# Patient Record
Sex: OTHER/UNKNOWN | URBAN - METROPOLITAN AREA
[De-identification: names, ages, dates, MRNs, and addresses within clinical notes are randomized per-mention and may not be internally consistent; named-entity substitution may affect disease eponyms.]

---

## 2020-11-17 ENCOUNTER — APPOINTMENT (OUTPATIENT)
Age: 25
Setting detail: DERMATOLOGY
End: 2020-11-27

## 2020-11-17 VITALS
DIASTOLIC BLOOD PRESSURE: 98 MMHG | HEIGHT: 66 IN | SYSTOLIC BLOOD PRESSURE: 175 MMHG | WEIGHT: 150 LBS | TEMPERATURE: 97.3 F | HEART RATE: 77 BPM

## 2020-11-17 DIAGNOSIS — L20.89 OTHER ATOPIC DERMATITIS: ICD-10-CM

## 2020-11-17 PROBLEM — L20.84 INTRINSIC (ALLERGIC) ECZEMA: Status: ACTIVE | Noted: 2020-11-17

## 2020-11-17 PROCEDURE — 99203 OFFICE O/P NEW LOW 30 MIN: CPT

## 2020-11-17 PROCEDURE — OTHER TREATMENT REGIMEN: OTHER

## 2020-11-17 PROCEDURE — OTHER PRESCRIPTION: OTHER

## 2020-11-17 PROCEDURE — OTHER COUNSELING: OTHER

## 2020-11-17 PROCEDURE — OTHER MIPS QUALITY: OTHER

## 2020-11-17 RX ORDER — TRIAMCINOLONE ACETONIDE 1 MG/G
0.1% CREAM TOPICAL BID
Qty: 1 | Refills: 1 | Status: ERX | COMMUNITY
Start: 2020-11-17

## 2020-11-17 ASSESSMENT — SEVERITY ASSESSMENT 2020: SEVERITY 2020: MODERATE

## 2020-11-17 NOTE — PROCEDURE: MIPS QUALITY
Detail Level: Detailed
Quality 110: Preventive Care And Screening: Influenza Immunization: Influenza Immunization not Administered because Patient Refused.
Quality 226: Preventive Care And Screening: Tobacco Use: Screening And Cessation Intervention: Patient screened for tobacco use, is a smoker AND did not received Cessation Counseling for Unknown Reasons

## 2020-11-17 NOTE — PROCEDURE: TREATMENT REGIMEN
Initiate Treatment: triamcinolone acetonide 0.1 % topical cream BID\\nDays Supply: 30\\nSig: Apply a pea sized amount to affected areas twice daily for two weeks then once daily for a week
Samples Given: Dove sensitive skip soap\\nEucerin moisturize
Detail Level: Zone

## 2021-01-06 ENCOUNTER — APPOINTMENT (OUTPATIENT)
Age: 26
Setting detail: DERMATOLOGY
End: 2021-01-19

## 2021-08-31 ENCOUNTER — OFFICE (OUTPATIENT)
Dept: URBAN - METROPOLITAN AREA CLINIC 103 | Facility: CLINIC | Age: 26
Setting detail: OPHTHALMOLOGY
End: 2021-08-31
Payer: COMMERCIAL

## 2021-08-31 DIAGNOSIS — T54.3X1A: ICD-10-CM

## 2021-08-31 DIAGNOSIS — H16.423: ICD-10-CM

## 2021-08-31 PROCEDURE — 92002 INTRM OPH EXAM NEW PATIENT: CPT | Performed by: OPHTHALMOLOGY

## 2021-08-31 ASSESSMENT — TONOMETRY: OD_IOP_MMHG: 14

## 2021-08-31 ASSESSMENT — KERATOMETRY
OS_K1POWER_DIOPTERS: 42.00
OD_AXISANGLE_DEGREES: 106
OD_K2POWER_DIOPTERS: 44.00
OD_K1POWER_DIOPTERS: 42.25
OS_AXISANGLE_DEGREES: 069
OS_K2POWER_DIOPTERS: 43.25

## 2021-08-31 ASSESSMENT — VASCULARIZATION
OD_VASCULARIZATION: INFERIOR PANNUS
OS_VASCULARIZATION: INFERIOR PANNUS

## 2021-08-31 ASSESSMENT — SPHEQUIV_DERIVED
OS_SPHEQUIV: -1.625
OD_SPHEQUIV: -2.125

## 2021-08-31 ASSESSMENT — REFRACTION_AUTOREFRACTION
OD_SPHERE: -1.25
OS_AXIS: 164
OS_CYLINDER: -0.75
OS_SPHERE: -1.25
OD_AXIS: 017
OD_CYLINDER: -1.75

## 2021-08-31 ASSESSMENT — AXIALLENGTH_DERIVED
OS_AL: 24.5867
OD_AL: 24.5988

## 2021-08-31 ASSESSMENT — VISUAL ACUITY
OD_BCVA: 20/40
OS_BCVA: 20/25-

## 2021-08-31 ASSESSMENT — CONFRONTATIONAL VISUAL FIELD TEST (CVF)
OD_FINDINGS: FULL
OS_FINDINGS: FULL